# Patient Record
Sex: MALE | Race: WHITE | NOT HISPANIC OR LATINO | URBAN - METROPOLITAN AREA
[De-identification: names, ages, dates, MRNs, and addresses within clinical notes are randomized per-mention and may not be internally consistent; named-entity substitution may affect disease eponyms.]

---

## 2018-03-28 ENCOUNTER — APPOINTMENT (OUTPATIENT)
Dept: URBAN - METROPOLITAN AREA CLINIC 200 | Age: 43
Setting detail: DERMATOLOGY
End: 2018-03-28

## 2018-03-28 DIAGNOSIS — B00.1 HERPESVIRAL VESICULAR DERMATITIS: ICD-10-CM

## 2018-03-28 DIAGNOSIS — L21.8 OTHER SEBORRHEIC DERMATITIS: ICD-10-CM

## 2018-03-28 PROBLEM — J30.1 ALLERGIC RHINITIS DUE TO POLLEN: Status: ACTIVE | Noted: 2018-03-28

## 2018-03-28 PROCEDURE — OTHER PRESCRIPTION: OTHER

## 2018-03-28 PROCEDURE — 99201: CPT

## 2018-03-28 RX ORDER — VALACYCLOVIR HYDROCHLORIDE 500 MG/1
TABLET, FILM COATED ORAL
Qty: 15 | Refills: 3 | Status: ERX | COMMUNITY
Start: 2018-03-28

## 2018-03-28 RX ORDER — TACROLIMUS 1 MG/G
OINTMENT TOPICAL
Qty: 1 | Refills: 0 | Status: ERX | COMMUNITY
Start: 2018-03-28

## 2018-03-28 ASSESSMENT — LOCATION SIMPLE DESCRIPTION DERM
LOCATION SIMPLE: RIGHT CHEEK
LOCATION SIMPLE: RIGHT NOSE
LOCATION SIMPLE: LEFT CHEEK

## 2018-03-28 ASSESSMENT — LOCATION DETAILED DESCRIPTION DERM
LOCATION DETAILED: LEFT INFERIOR MEDIAL MALAR CHEEK
LOCATION DETAILED: RIGHT INFERIOR MEDIAL MALAR CHEEK
LOCATION DETAILED: RIGHT NARIS

## 2018-03-28 ASSESSMENT — LOCATION ZONE DERM
LOCATION ZONE: FACE
LOCATION ZONE: NOSE

## 2018-03-28 NOTE — HPI: RASH
How Severe Is Your Rash?: moderate
Is This A New Presentation, Or A Follow-Up?: Rash
Additional History: Uses a c-pap machine

## 2020-01-04 ENCOUNTER — TRANSCRIBE ORDERS (OUTPATIENT)
Dept: RADIOLOGY | Age: 45
End: 2020-01-04

## 2020-01-04 ENCOUNTER — HOSPITAL ENCOUNTER (OUTPATIENT)
Dept: RADIOLOGY | Age: 45
Discharge: HOME | End: 2020-01-04
Attending: INTERNAL MEDICINE
Payer: COMMERCIAL

## 2020-01-04 DIAGNOSIS — M25.511 PAIN IN RIGHT SHOULDER: ICD-10-CM

## 2020-01-04 DIAGNOSIS — M25.511 PAIN IN RIGHT SHOULDER: Primary | ICD-10-CM

## 2020-01-04 PROCEDURE — 73030 X-RAY EXAM OF SHOULDER: CPT | Mod: RT

## 2023-07-27 ENCOUNTER — TRANSCRIBE ORDERS (OUTPATIENT)
Dept: SCHEDULING | Age: 48
End: 2023-07-27

## 2023-07-27 DIAGNOSIS — S46.011A STRAIN OF MUSCLE(S) AND TENDON(S) OF THE ROTATOR CUFF OF RIGHT SHOULDER, INITIAL ENCOUNTER: Primary | ICD-10-CM

## 2024-06-09 ENCOUNTER — HOSPITAL ENCOUNTER (EMERGENCY)
Facility: HOSPITAL | Age: 49
Discharge: HOME | End: 2024-06-09
Attending: EMERGENCY MEDICINE
Payer: COMMERCIAL

## 2024-06-09 VITALS
DIASTOLIC BLOOD PRESSURE: 101 MMHG | HEIGHT: 66 IN | WEIGHT: 140.7 LBS | TEMPERATURE: 98.2 F | RESPIRATION RATE: 16 BRPM | BODY MASS INDEX: 22.61 KG/M2 | SYSTOLIC BLOOD PRESSURE: 152 MMHG | HEART RATE: 99 BPM | OXYGEN SATURATION: 99 %

## 2024-06-09 DIAGNOSIS — Z20.9 EXPOSURE TO BAT WITHOUT KNOWN BITE: Primary | ICD-10-CM

## 2024-06-09 PROCEDURE — 90471 IMMUNIZATION ADMIN: CPT

## 2024-06-09 PROCEDURE — 99281 EMR DPT VST MAYX REQ PHY/QHP: CPT | Mod: 25

## 2024-06-09 PROCEDURE — 90375 RABIES IG IM/SC: CPT | Mod: JW

## 2024-06-09 PROCEDURE — 63600000 HC DRUGS/DETAIL CODE: Mod: JW

## 2024-06-09 PROCEDURE — 90675 RABIES VACCINE IM: CPT | Mod: JZ

## 2024-06-09 PROCEDURE — 96372 THER/PROPH/DIAG INJ SC/IM: CPT

## 2024-06-09 PROCEDURE — 3E0234Z INTRODUCTION OF SERUM, TOXOID AND VACCINE INTO MUSCLE, PERCUTANEOUS APPROACH: ICD-10-PCS | Performed by: EMERGENCY MEDICINE

## 2024-06-09 PROCEDURE — 63600000 HC DRUGS/DETAIL CODE: Mod: JZ

## 2024-06-09 RX ORDER — LORATADINE 10 MG/1
10 TABLET ORAL DAILY
COMMUNITY

## 2024-06-09 RX ORDER — FLUTICASONE PROPIONATE 50 MCG
1 SPRAY, SUSPENSION (ML) NASAL DAILY
COMMUNITY

## 2024-06-09 RX ADMIN — RABIES VACCINE 1 ML: KIT at 20:31

## 2024-06-09 RX ADMIN — RABIES IMMUNE GLOBULIN (HUMAN) 1290 UNITS: 300 INJECTION, SOLUTION INFILTRATION; INTRAMUSCULAR at 20:32

## 2024-06-09 NOTE — ED PROVIDER NOTES
Emergency Medicine Note  HPI   HISTORY OF PRESENT ILLNESS     Patient is a 49-year-old male with past medical history of asthma who presents today due to concerns for a bat exposure.  Patient reports that his wife thought she may have seen a bat called the wall in the bedroom within the past 2 weeks.  Patient states that his wife was shown a video at work with a bat flying in the bedroom and she was convinced that this is what she saw in their bedroom.  Patient states that a couple of days after she thought she may have seen a bat, he noticed blood on a towel after showering in the morning.  Patient states that the blood was coming from to marks on his forehead.  Patient states that the maria elena scabbed over a couple of days later.  Patient has not gotten rabies vaccination before.          Patient History   PAST HISTORY     Reviewed from Nursing Triage:       Past Medical History:   Diagnosis Date    Asthma        History reviewed. No pertinent surgical history.    History reviewed. No pertinent family history.    Social History     Tobacco Use    Smoking status: Never    Smokeless tobacco: Never   Substance Use Topics    Alcohol use: Never    Drug use: Never         Review of Systems   REVIEW OF SYSTEMS     Review of Systems      VITALS     ED Vitals      Date/Time Temp Pulse Resp BP SpO2 [facility]   06/09/24 1922 36.8 °C (98.2 °F) 99 16 152/101 99 % SDB                         Physical Exam   PHYSICAL EXAM     Physical Exam  Constitutional:       General: He is not in acute distress.     Appearance: Normal appearance. He is normal weight. He is not ill-appearing or toxic-appearing.   HENT:      Head: Normocephalic and atraumatic.   Eyes:      Conjunctiva/sclera: Conjunctivae normal.   Skin:     General: Skin is warm and dry.      Findings: No rash.   Neurological:      General: No focal deficit present.      Mental Status: He is alert. Mental status is at baseline.   Psychiatric:         Mood and Affect: Mood normal.          Behavior: Behavior normal.           PROCEDURES     Procedures     DATA     Results       None            Imaging Results    None         No orders to display       Scoring tools                                  ED Course & MDM   MDM / ED COURSE / CLINICAL IMPRESSION / DISPO     Medical Decision Making  Vital signs have been reviewed. The oxygen saturation is 99% which is normal.     Problems Addressed:  Exposure to bat without known bite: acute illness or injury    Risk  Prescription drug management.        ED Course as of 06/09/24 2025   Sun Jun 09, 2024 2002 Impression/plan-patient presents due to possible bat exposure.  Patient reports that his wife may have seen a bat crawling up the bedroom wall within the past 2 weeks.  Patient noted blood on his towel after drying his head.  Patient states that he saw 2 drops of dried blood on his forehead a couple of days after his wife may have seen the bat.  Patient is not vaccinated for rabies.  Shared decision making with patient that we would give him the rabies immunoglobulin as well as vaccination.  Patient was instructed to also instruct his wife to have rabies vaccination.  Patient will follow-up for rabies vaccine on day 3, 7, 14.  Return precautions to be discussed. [MW]      ED Course User Index  [MW] Maira Ellsworth PA C     Clinical Impression      None                 Maira Ellsworth PA C  06/09/24 2026       Maira Ellsworth PA C  06/09/24 2029

## 2024-06-10 NOTE — DISCHARGE INSTRUCTIONS
You were evaluated in the emergency department today due to a possible bat exposure.    We gave you the rabies immunoglobulin and first dose of the vaccine today.  Please go to the urgent care in [place] for the other 3 doses of the vaccination on the listed days, 3, 7, 14.    Please make sure that you let others know who were exposed to the bat to also get the rabies immunoglobulin and the vaccinations.

## 2024-06-10 NOTE — ED ATTESTATION NOTE
Procedures  Physical Exam  Review of Systems    6/9/20248:03 PM  I have personally seen and examined the patient.  I reviewed and agree with the PA/NP/Resident's assessment and plan of care.    My examination, assessment, and plan of care of William Ac is as follows:  The patient presents with concern that he could have been bitten by a bat within his home.  This is a 49-year-old gentleman who states his wife heard fluttering in their home and thought she saw a bat.  The patient apparently awoke within the past 2 weeks with blood on his forehead and he took a picture of 2 spots on his forehead that he thinks could have been puncture wounds.  The patient has no symptoms of illness at this time.  His wife is asymptomatic as well.    Exam: The patient is alert in no acute distress.  There are no wounds on his forehead at this time.  He is neurologically intact and mentating appropriately.  Impression/Plan: We are recommending the patient receive prophylaxis for rabies exposure.  We have also told him he should have his wife treated as well.    Vital Sign Review: Vital signs have been ordered and reviewed. The oxygen saturation is 99% on room air, normal    Medical Decision Making  Risk  Prescription drug management.         I was physically present for the key/critical portions of the following procedures: None    This document was created using dragon dictation software.  There might be some typographical errors due to this technology.     Roberto Martinez,   06/09/24 2005

## 2024-06-12 ENCOUNTER — HOSPITAL ENCOUNTER (OUTPATIENT)
Facility: CLINIC | Age: 49
Discharge: HOME | End: 2024-06-12
Attending: FAMILY MEDICINE
Payer: COMMERCIAL

## 2024-06-12 VITALS — TEMPERATURE: 97.9 F

## 2024-06-12 PROCEDURE — 90471 IMMUNIZATION ADMIN: CPT | Performed by: FAMILY MEDICINE

## 2024-06-12 PROCEDURE — 90675 RABIES VACCINE IM: CPT | Performed by: FAMILY MEDICINE

## 2024-06-16 ENCOUNTER — HOSPITAL ENCOUNTER (OUTPATIENT)
Facility: CLINIC | Age: 49
Discharge: HOME | End: 2024-06-16
Attending: NURSE PRACTITIONER
Payer: COMMERCIAL

## 2024-06-16 PROCEDURE — 90471 IMMUNIZATION ADMIN: CPT | Performed by: NURSE PRACTITIONER

## 2024-06-16 PROCEDURE — 90675 RABIES VACCINE IM: CPT | Performed by: NURSE PRACTITIONER

## 2024-06-23 ENCOUNTER — HOSPITAL ENCOUNTER (OUTPATIENT)
Facility: CLINIC | Age: 49
Discharge: HOME | End: 2024-06-23
Attending: HOSPITALIST
Payer: COMMERCIAL

## 2024-06-23 VITALS — TEMPERATURE: 98 F

## 2024-06-23 PROCEDURE — 90675 RABIES VACCINE IM: CPT | Performed by: HOSPITALIST

## 2024-06-23 PROCEDURE — 90471 IMMUNIZATION ADMIN: CPT | Performed by: HOSPITALIST
